# Patient Record
Sex: MALE | Race: WHITE | HISPANIC OR LATINO | URBAN - METROPOLITAN AREA
[De-identification: names, ages, dates, MRNs, and addresses within clinical notes are randomized per-mention and may not be internally consistent; named-entity substitution may affect disease eponyms.]

---

## 2024-05-29 ENCOUNTER — HOSPITAL ENCOUNTER (EMERGENCY)
Facility: HOSPITAL | Age: 49
Discharge: HOME | End: 2024-05-29
Attending: EMERGENCY MEDICINE

## 2024-05-29 VITALS
WEIGHT: 164 LBS | TEMPERATURE: 99 F | DIASTOLIC BLOOD PRESSURE: 92 MMHG | OXYGEN SATURATION: 100 % | SYSTOLIC BLOOD PRESSURE: 167 MMHG | HEART RATE: 64 BPM | RESPIRATION RATE: 20 BRPM

## 2024-05-29 DIAGNOSIS — L23.7 POISON IVY: Primary | ICD-10-CM

## 2024-05-29 DIAGNOSIS — L28.2 PRURITIC RASH: ICD-10-CM

## 2024-05-29 PROCEDURE — 25000003 PHARM REV CODE 250: Performed by: NURSE PRACTITIONER

## 2024-05-29 PROCEDURE — 99284 EMERGENCY DEPT VISIT MOD MDM: CPT

## 2024-05-29 RX ORDER — TRIAMCINOLONE ACETONIDE 1 MG/G
OINTMENT TOPICAL ONCE
Status: COMPLETED | OUTPATIENT
Start: 2024-05-29 | End: 2024-05-29

## 2024-05-29 RX ORDER — HYDROXYZINE HYDROCHLORIDE 25 MG/1
50 TABLET, FILM COATED ORAL NIGHTLY PRN
Qty: 20 TABLET | Refills: 0 | Status: SHIPPED | OUTPATIENT
Start: 2024-05-29

## 2024-05-29 RX ORDER — TRIAMCINOLONE ACETONIDE 5 MG/G
OINTMENT TOPICAL 2 TIMES DAILY
Qty: 15 G | Refills: 0 | Status: SHIPPED | OUTPATIENT
Start: 2024-05-29

## 2024-05-29 RX ADMIN — TRIAMCINOLONE ACETONIDE: 1 OINTMENT TOPICAL at 02:05

## 2024-05-29 NOTE — DISCHARGE INSTRUCTIONS
Please use the steroid cream over the affected areas to help reduce itching and inflammation.    You can take oatmeal baths or use cool, wet compresses over the affected area for discomfort.  Calamine lotion also may provide some relief.  This is available over-the-counter.    You can take the hydroxyzine to help with the itching before bedtime.  This medication may cause drowsiness, so please use with caution.    Follow-up with a dermatologist if there is no significant improvement of your rash in 3 to 5 days.    Return to the emergency room for any new or worsening symptoms or concerns.    Utilice la crema con esteroides sobre las áreas afectadas para ayudar a reducir la picazón y la inflamación.    Puede charley hidroxizina para ayudar con la picazón antes de acostarse.  Deandra medicamento puede causar somnolencia, así que úselo con precaución.    Puede charley carlita de milan o usar compresas húmedas y frías sobre el área afectada para aliviar el malestar.  La loción de calamina también puede proporcionar cierto alivio.  [place] está disponible sin receta.    Mercedes un seguimiento con un dermatólogo si no hay mendoza mejora significativa de anaya erupción en 3 a 5 días.    Regrese a la faustina de emergencias si presenta cualquier síntoma o inquietud nueva o que empeore.

## 2024-05-29 NOTE — ED PROVIDER NOTES
Encounter Date: 5/29/2024       History     Chief Complaint   Patient presents with    Rash     Reports rash all over body x 3 days. Reports was out working in yard. Denies meds pta.      Pt is a 48-year-old female who presents to the emergency department for rash x4 days. Pt reports he was working in the yard and a few days later reports a itchy rash starting on his right arm. The rash has now spread to bilateral arms, lower bilateral abdomen, and left neck. Pt also reports itchy and irritated eyes. Pt reports his itching is worse at night. Pt has tried Benadryl and OTC ointment without relief. Pt denies nausea, vomiting, diarrhea, fever, chills, diaphoresis, congestion, sore throat, chest pain, palpitations, SOB, or difficulty breathing. Pt denies new lotions, soaps, oral medications, or laundry detergents.     The history is provided by the patient. No  was used.     Review of patient's allergies indicates:  No Known Allergies  History reviewed. No pertinent past medical history.  History reviewed. No pertinent surgical history.  No family history on file.  Social History     Tobacco Use    Smoking status: Never    Smokeless tobacco: Never   Substance Use Topics    Alcohol use: Never    Drug use: Never     Review of Systems   Constitutional:  Negative for fever.   HENT:  Negative for facial swelling and trouble swallowing.    Respiratory:  Negative for shortness of breath and wheezing.    Musculoskeletal:  Negative for arthralgias.   Skin:  Positive for rash.       Physical Exam     Initial Vitals [05/29/24 1301]   BP Pulse Resp Temp SpO2   (!) 167/92 64 20 98.6 °F (37 °C) 100 %      MAP       --         Physical Exam    Nursing note and vitals reviewed.  Constitutional: He appears well-developed and well-nourished. He is not diaphoretic. No distress.   Eyes: Pupils are equal, round, and reactive to light.   Neck:   Normal range of motion.  Pulmonary/Chest: Effort normal and breath sounds  normal. No respiratory distress.   Musculoskeletal:      Cervical back: Normal range of motion.     Neurological: He is alert and oriented to person, place, and time. No sensory deficit.   Skin: Skin is warm and dry. Rash (Raised, erythematous, patchy appearance to arms, abdomen, left neck) noted.   Psychiatric: He has a normal mood and affect.         ED Course   Procedures  Labs Reviewed - No data to display       Imaging Results    None          Medications   triamcinolone acetonide 0.1% ointment ( Topical (Top) Given 5/29/24 8155)     Medical Decision Making  This is an urgent evaluation of a 48-year-old male who presents to the emergency room complaining of a pruritic rash.  He appears to have been exposed to poison ivy, sumac, or oak while working outdoors.  He denies any constitutional symptoms or respiratory complaints.  On exam, there is no oropharyngeal edema, no stridor, lungs are clear to auscultation; no evidence to suggest systemic involvement.  Will provide topical steroid creams and version antihistamines for night pruritus.  Dermatology referral given.  Return precautions.    Risk  Prescription drug management.                                      Clinical Impression:  Final diagnoses:  [L23.7] Poison ivy (Primary)  [L28.2] Pruritic rash          ED Disposition Condition    Discharge Stable          ED Prescriptions       Medication Sig Dispense Start Date End Date Auth. Provider    triamcinolone (KENALOG) 0.5 % ointment Apply topically 2 (two) times daily. 15 g 5/29/2024 -- Tammie Cloud NP    hydrOXYzine HCL (ATARAX) 25 MG tablet Take 2 tablets (50 mg total) by mouth nightly as needed for Itching. May cause drowsiness 20 tablet 5/29/2024 -- Tammie Cloud NP          Follow-up Information       Follow up With Specialties Details Why Contact Info    Kaiden Soliz MD Dermatology Schedule an appointment as soon as possible for a visit  DERMATOLOGY - if no improvement in 3-5 days [address] [facility]  [address]  [facility]  [facility] [address]  [phone]      [facility] - Emergency Dept Emergency Medicine  As needed, If symptoms worsen [address] [facility] [place] [id number] [phone]             Tammie Cloud NP  05/29/24 4013